# Patient Record
Sex: MALE | Race: WHITE | URBAN - METROPOLITAN AREA
[De-identification: names, ages, dates, MRNs, and addresses within clinical notes are randomized per-mention and may not be internally consistent; named-entity substitution may affect disease eponyms.]

---

## 2021-04-06 ENCOUNTER — OFFICE VISIT (OUTPATIENT)
Dept: URBAN - METROPOLITAN AREA CLINIC 41 | Facility: CLINIC | Age: 56
End: 2021-04-06
Payer: COMMERCIAL

## 2021-04-06 PROCEDURE — 92235 FLUORESCEIN ANGRPH MLTIFRAME: CPT | Performed by: OPHTHALMOLOGY

## 2021-04-06 PROCEDURE — 92134 CPTRZ OPH DX IMG PST SGM RTA: CPT | Performed by: OPHTHALMOLOGY

## 2021-04-06 PROCEDURE — 92250 FUNDUS PHOTOGRAPHY W/I&R: CPT | Performed by: OPHTHALMOLOGY

## 2021-04-06 PROCEDURE — 99214 OFFICE O/P EST MOD 30 MIN: CPT | Performed by: OPHTHALMOLOGY

## 2021-04-06 ASSESSMENT — INTRAOCULAR PRESSURE
OD: 11
OS: 11

## 2021-04-06 NOTE — IMPRESSION/PLAN
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 Plan: Exam/photos/OCT show severe NPDR w/o CSDME OS. FA 4/6/21 shows no macular leakage, areas of ischemia w/o NVE. Reviewed options of observation, anti-VEGF, and laser; recommend monitoring for progression. Last A1c 8.4. Cont BS/BP/chol control.

## 2021-04-06 NOTE — IMPRESSION/PLAN
Impression: Type 2 diabetes mellitus with proliferative diabetic retinopathy without macular edema, right eye: Y01.9579. Plan: Exam/photos/OCT show VH c/w active PDR w/o CSDME OD. FA 4/6/21 shows no macular leakage, sweeps not performed. Currently, there is no NVI and the IOP is within normal limits. The Dx, NHx, and Px PDR were discussed at length. Reviewed options of observation, anti-VEGF, steroids, and laser; recommend anti-VEGF followed by PRP. The patient understands that the goal of treatment is not to improve vision, but to reduce the likelihood of severe vision loss. The patient was urged to work closely with their PCP to avoid systemic complications of diabetic disease. 

1-2 weeks, Luc[facility] OD; then 1-2 weeks light  OD (PHX-B)

## 2021-04-20 ENCOUNTER — PROCEDURE (OUTPATIENT)
Dept: URBAN - METROPOLITAN AREA CLINIC 41 | Facility: CLINIC | Age: 56
End: 2021-04-20
Payer: COMMERCIAL

## 2021-04-20 PROCEDURE — 67028 INJECTION EYE DRUG: CPT | Performed by: OPHTHALMOLOGY

## 2021-04-20 ASSESSMENT — INTRAOCULAR PRESSURE
OD: 12
OS: 10

## 2021-04-28 ENCOUNTER — PROCEDURE (OUTPATIENT)
Dept: URBAN - METROPOLITAN AREA CLINIC 7 | Facility: CLINIC | Age: 56
End: 2021-04-28
Payer: COMMERCIAL

## 2021-04-28 PROCEDURE — 67228 TREATMENT X10SV RETINOPATHY: CPT | Performed by: OPHTHALMOLOGY

## 2021-04-28 ASSESSMENT — INTRAOCULAR PRESSURE
OS: 18
OD: 18

## 2021-05-25 ENCOUNTER — OFFICE VISIT (OUTPATIENT)
Dept: URBAN - METROPOLITAN AREA CLINIC 27 | Facility: CLINIC | Age: 56
End: 2021-05-25
Payer: COMMERCIAL

## 2021-05-25 PROCEDURE — 92012 INTRM OPH EXAM EST PATIENT: CPT | Performed by: OPHTHALMOLOGY

## 2021-05-25 PROCEDURE — 92134 CPTRZ OPH DX IMG PST SGM RTA: CPT | Performed by: OPHTHALMOLOGY

## 2021-05-25 ASSESSMENT — INTRAOCULAR PRESSURE
OD: 10
OS: 10

## 2021-05-25 NOTE — IMPRESSION/PLAN
Impression: Severe NPDR w/o DME OS Plan: Exam/OCT show severe NPDR w/o CSDME OS. FA 4/6/21 showed no macular leakage, areas of ischemia w/o NVE. Reviewed options of observation, anti-VEGF, and laser; recommend monitoring for progression. Last A1c 8.4. Cont BS/BP/chol control.

## 2021-05-25 NOTE — IMPRESSION/PLAN
Impression: PDR w/o CSDME OD
s/p PRP 4/28/21 Plan: Exam/OCT show improving VH s/p Luc[facility]is 4/20/21 w/o CSDME OD. FA 4/6/21 showed no macular leakage, sweeps not performed. Reviewed options of observation, anti-VEGF, steroids, and laser; recommend monitoring for recurrence. 4-6 weeks, OCT OU, reeval DR/DME OU

## 2021-07-06 ENCOUNTER — OFFICE VISIT (OUTPATIENT)
Dept: URBAN - METROPOLITAN AREA CLINIC 27 | Facility: CLINIC | Age: 56
End: 2021-07-06
Payer: COMMERCIAL

## 2021-07-06 PROCEDURE — 92012 INTRM OPH EXAM EST PATIENT: CPT | Performed by: OPHTHALMOLOGY

## 2021-07-06 PROCEDURE — 92134 CPTRZ OPH DX IMG PST SGM RTA: CPT | Performed by: OPHTHALMOLOGY

## 2021-07-06 ASSESSMENT — INTRAOCULAR PRESSURE
OS: 14
OD: 14

## 2021-07-06 NOTE — IMPRESSION/PLAN
Impression: PDR w/o CSDME OD
s/p PRP 4/28/21 Plan: Exam/OCT show recurrent VH s/p Luc[facility]is 4/20/21 w/o CSDME OD. FA 4/6/21 showed no macular leakage, sweeps not performed. Reviewed options of observation, anti-VEGF, steroids, and laser; pt elects monitoring for recurrence. 4-6 weeks, OCT OU, reeval DR/DME OU

## 2021-08-09 ENCOUNTER — OFFICE VISIT (OUTPATIENT)
Dept: URBAN - METROPOLITAN AREA CLINIC 27 | Facility: CLINIC | Age: 56
End: 2021-08-09
Payer: COMMERCIAL

## 2021-08-09 PROCEDURE — 92012 INTRM OPH EXAM EST PATIENT: CPT | Performed by: OPHTHALMOLOGY

## 2021-08-09 PROCEDURE — 92134 CPTRZ OPH DX IMG PST SGM RTA: CPT | Performed by: OPHTHALMOLOGY

## 2021-08-09 ASSESSMENT — INTRAOCULAR PRESSURE
OS: 13
OD: 19

## 2021-08-09 NOTE — IMPRESSION/PLAN
Impression: Severe NPDR w/o DME OS Plan: Exam/OCT show severe NPDR w/o CSDME OS. FA 4/6/21 showed no macular leakage, areas of ischemia w/o NVE. Reviewed options of observation, anti-VEGF, and laser; recommend monitoring for progression. Last A1c 9.1. Cont BS/BP/chol control.

## 2021-08-09 NOTE — IMPRESSION/PLAN
Impression: PDR w/o CSDME OD
s/p PRP 4/28/21 Plan: Exam/OCT show improving VH s/p Luc[facility]is 4/20/21; no CSDME OD. FA 4/6/21 showed no macular leakage, sweeps not performed. Reviewed options of observation, anti-VEGF, steroids, and laser; pt elects monitoring for recurrence. 4-6 weeks, OCT OU, reeval DR/DME OU

## 2021-10-04 ENCOUNTER — OFFICE VISIT (OUTPATIENT)
Dept: URBAN - METROPOLITAN AREA CLINIC 27 | Facility: CLINIC | Age: 56
End: 2021-10-04
Payer: COMMERCIAL

## 2021-10-04 DIAGNOSIS — H25.13 AGE-RELATED NUCLEAR CATARACT, BILATERAL: ICD-10-CM

## 2021-10-04 DIAGNOSIS — H43.11 VITREOUS HEMORRHAGE, RIGHT EYE: ICD-10-CM

## 2021-10-04 DIAGNOSIS — E11.3492 TYPE 2 DIABETES MELLITUS WITH SEVERE NONPROLIFERATIVE DIABETIC RETINOPATHY WITHOUT MACULAR EDEMA, LEFT EYE: ICD-10-CM

## 2021-10-04 DIAGNOSIS — E11.3591 TYPE 2 DIABETES MELLITUS WITH PROLIFERATIVE DIABETIC RETINOPATHY WITHOUT MACULAR EDEMA, RIGHT EYE: Primary | ICD-10-CM

## 2021-10-04 PROCEDURE — 92134 CPTRZ OPH DX IMG PST SGM RTA: CPT | Performed by: OPHTHALMOLOGY

## 2021-10-04 PROCEDURE — 99214 OFFICE O/P EST MOD 30 MIN: CPT | Performed by: OPHTHALMOLOGY

## 2021-10-04 ASSESSMENT — INTRAOCULAR PRESSURE
OS: 15
OD: 17

## 2021-10-04 NOTE — IMPRESSION/PLAN
Impression: PDR w/o CSDME OD
s/p PRP 4/28/21 Plan: Exam/OCT show improving VH s/p Lucentis 4/20/21; no CSDME OD. FA 4/6/21 showed no macular leakage, sweeps not performed. Reviewed options of observation, anti-VEGF, steroids, and laser; pt elects monitoring for recurrence. 4-6 weeks, photos/FA sweeps/OCT OU, reeval DR/DME OU

## 2021-11-08 ENCOUNTER — OFFICE VISIT (OUTPATIENT)
Dept: URBAN - METROPOLITAN AREA CLINIC 27 | Facility: CLINIC | Age: 56
End: 2021-11-08
Payer: COMMERCIAL

## 2021-11-08 PROCEDURE — 92012 INTRM OPH EXAM EST PATIENT: CPT | Performed by: OPHTHALMOLOGY

## 2021-11-08 PROCEDURE — 92235 FLUORESCEIN ANGRPH MLTIFRAME: CPT | Performed by: OPHTHALMOLOGY

## 2021-11-08 PROCEDURE — 92250 FUNDUS PHOTOGRAPHY W/I&R: CPT | Performed by: OPHTHALMOLOGY

## 2021-11-08 PROCEDURE — 92134 CPTRZ OPH DX IMG PST SGM RTA: CPT | Performed by: OPHTHALMOLOGY

## 2021-11-08 ASSESSMENT — INTRAOCULAR PRESSURE
OS: 11
OD: 18

## 2021-11-08 NOTE — IMPRESSION/PLAN
Impression: proliferative diabetic retinopathy without macular edema OU
 - s/p PRP OD 4/28/21 Plan: Exam/photos/OCT show improving VH w/o CSDME OD, last Lucentis 4/20/21. FA sweeps 11/8/21 shows no macular leakage, NVE along [address]. Reviewed options of observation, anti-VEGF, steroids, and laser; pt elects monitoring for recurrence. Exam/photos/OCT show new-onset NVD w/o CSDME OS. FA sweeps 11/8/21 shows no macular leakage, ischemia with NVD OS. As there is no VH at this time, will monitor closely. 4-6 weeks, OCT OU, reeval

## 2022-01-05 ENCOUNTER — OFFICE VISIT (OUTPATIENT)
Dept: URBAN - METROPOLITAN AREA CLINIC 27 | Facility: CLINIC | Age: 57
End: 2022-01-05
Payer: COMMERCIAL

## 2022-01-05 DIAGNOSIS — H43.812 VITREOUS DEGENERATION, LEFT EYE: ICD-10-CM

## 2022-01-05 DIAGNOSIS — E11.3593 TYPE 2 DIABETES MELLITUS WITH PROLIFERATIVE DIABETIC RETINOPATHY WITHOUT MACULAR EDEMA, BILATERAL: Primary | ICD-10-CM

## 2022-01-05 PROCEDURE — 92134 CPTRZ OPH DX IMG PST SGM RTA: CPT | Performed by: OPHTHALMOLOGY

## 2022-01-05 PROCEDURE — 99214 OFFICE O/P EST MOD 30 MIN: CPT | Performed by: OPHTHALMOLOGY

## 2022-01-05 ASSESSMENT — INTRAOCULAR PRESSURE
OD: 15
OS: 14

## 2022-01-05 NOTE — IMPRESSION/PLAN
Impression: PDR OD / NPDR OS
 - s/p PRP OD 4/28/21 Plan: Exam and OCT reveal no DME OU. Observe. BS control.  

Return in 6-8 weeks with [facility], located within [facility]

## 2022-02-14 ENCOUNTER — OFFICE VISIT (OUTPATIENT)
Dept: URBAN - METROPOLITAN AREA CLINIC 27 | Facility: CLINIC | Age: 57
End: 2022-02-14
Payer: COMMERCIAL

## 2022-02-14 PROCEDURE — 92012 INTRM OPH EXAM EST PATIENT: CPT | Performed by: OPHTHALMOLOGY

## 2022-02-14 PROCEDURE — 92134 CPTRZ OPH DX IMG PST SGM RTA: CPT | Performed by: OPHTHALMOLOGY

## 2022-02-14 ASSESSMENT — INTRAOCULAR PRESSURE
OD: 11
OS: 11

## 2022-02-14 NOTE — IMPRESSION/PLAN
Impression: proliferative diabetic retinopathy without macular edema OU
 - s/p PRP OD 4/28/21 Plan: Exam/OCT show improving VH w/o CSDME OD, last Luc[facility]is 4/20/21. FA sweeps 11/8/21 showed no macular leakage, NVE along [address]. Reviewed options of observation, anti-VEGF, steroids, and laser; pt elects monitoring for recurrence. Exam/OCT show NVD w/o CSDME OS. FA sweeps 11/8/21 showed no macular leakage, ischemia with NVD OS. As there is no VH at this time, will monitor closely. 4-6 weeks, OCT OU, reeval

## 2022-03-21 ENCOUNTER — OFFICE VISIT (OUTPATIENT)
Dept: URBAN - METROPOLITAN AREA CLINIC 27 | Facility: CLINIC | Age: 57
End: 2022-03-21
Payer: COMMERCIAL

## 2022-03-21 PROCEDURE — 99214 OFFICE O/P EST MOD 30 MIN: CPT | Performed by: OPHTHALMOLOGY

## 2022-03-21 PROCEDURE — 92134 CPTRZ OPH DX IMG PST SGM RTA: CPT | Performed by: OPHTHALMOLOGY

## 2022-03-21 ASSESSMENT — INTRAOCULAR PRESSURE
OD: 11
OS: 11

## 2022-03-21 NOTE — IMPRESSION/PLAN
Impression: proliferative diabetic retinopathy without macular edema OU
 - s/p PRP OD 4/28/21 Plan: Exam/OCT show improving VH w/o CSDME OD, last Lucentis 4/20/21. FA sweeps 11/8/21 showed no macular leakage, NVE along [address]. Reviewed options of observation, anti-VEGF, steroids, and laser; pt elects monitoring for recurrence. Exam/OCT show NVD w/o CSDME OS. FA sweeps 11/8/21 showed no macular leakage, ischemia with NVD OS. As there is no VH at this time, will monitor closely. 

6 weeks, photos/FA sweeps/OCT OU, reeval

## 2022-05-02 ENCOUNTER — OFFICE VISIT (OUTPATIENT)
Dept: URBAN - METROPOLITAN AREA CLINIC 27 | Facility: CLINIC | Age: 57
End: 2022-05-02
Payer: COMMERCIAL

## 2022-05-02 DIAGNOSIS — E11.3593 TYPE 2 DIABETES MELLITUS WITH PROLIFERATIVE DIABETIC RETINOPATHY WITHOUT MACULAR EDEMA, BILATERAL: Primary | ICD-10-CM

## 2022-05-02 DIAGNOSIS — H43.12 VITREOUS HEMORRHAGE, LEFT EYE: ICD-10-CM

## 2022-05-02 PROCEDURE — 92012 INTRM OPH EXAM EST PATIENT: CPT | Performed by: OPHTHALMOLOGY

## 2022-05-02 PROCEDURE — 92235 FLUORESCEIN ANGRPH MLTIFRAME: CPT | Performed by: OPHTHALMOLOGY

## 2022-05-02 PROCEDURE — 92250 FUNDUS PHOTOGRAPHY W/I&R: CPT | Performed by: OPHTHALMOLOGY

## 2022-05-02 PROCEDURE — 92134 CPTRZ OPH DX IMG PST SGM RTA: CPT | Performed by: OPHTHALMOLOGY

## 2022-05-02 PROCEDURE — 67028 INJECTION EYE DRUG: CPT | Performed by: OPHTHALMOLOGY

## 2022-05-02 ASSESSMENT — INTRAOCULAR PRESSURE
OS: 14
OD: 15

## 2022-05-02 NOTE — IMPRESSION/PLAN
Impression: proliferative diabetic retinopathy without macular edema OU
 - s/p PRP OD 4/28/21 Plan: Exam/OCT show active PDR w/o CSDME OD, last Luc[facility]is 4/20/21. Photos 5/2/22 showed NVE. FA sweeps 5/2/22 showed no macular leakage, several areas of NVE. Reviewed options of observation, anti-VEGF, steroids, and laser; pt elects monitoring for recurrence. Pt c/o new floaters OS. Exam/OCT show NVD/VH w/o CSDME OS. Photos 5/2/22 showed PDR/VH. FA sweeps 5/2/22 showed no macular leakage, ischemia with NVD OS. Recommend Avastin OS today.  

6 weeks, DFE/OCT OU, reeval

## 2022-06-13 ENCOUNTER — OFFICE VISIT (OUTPATIENT)
Dept: URBAN - METROPOLITAN AREA CLINIC 27 | Facility: CLINIC | Age: 57
End: 2022-06-13
Payer: COMMERCIAL

## 2022-06-13 DIAGNOSIS — E11.3593 TYPE 2 DIABETES MELLITUS WITH PROLIFERATIVE DIABETIC RETINOPATHY WITHOUT MACULAR EDEMA, BILATERAL: Primary | ICD-10-CM

## 2022-06-13 PROCEDURE — 92134 CPTRZ OPH DX IMG PST SGM RTA: CPT | Performed by: OPHTHALMOLOGY

## 2022-06-13 PROCEDURE — 67028 INJECTION EYE DRUG: CPT | Performed by: OPHTHALMOLOGY

## 2022-06-13 PROCEDURE — 92012 INTRM OPH EXAM EST PATIENT: CPT | Performed by: OPHTHALMOLOGY

## 2022-06-13 ASSESSMENT — INTRAOCULAR PRESSURE
OD: 13
OS: 12

## 2022-06-13 NOTE — IMPRESSION/PLAN
Impression: proliferative diabetic retinopathy without macular edema OU
 - s/p PRP OD 4/28/21 Plan: Exam/OCT show active PDR w/o CSDME OD, last [facility]is 4/20/21. Photos 5/2/22 showed NVE. FA sweeps 5/2/22 showed no macular leakage, several areas of NVE. Reviewed options of observation, anti-VEGF, steroids, and laser; pt elects monitoring for recurrence. Improving but persistent floaters OS. Exam/OCT show NVD/VH w/o CSDME OS. Photos 5/2/22 showed PDR/VH. FA sweeps 5/2/22 showed no macular leakage, ischemia with NVD OS. Recommend Avastin OS today.  

6 weeks, DFE/OCT OU, reeval

## 2022-07-25 ENCOUNTER — OFFICE VISIT (OUTPATIENT)
Dept: URBAN - METROPOLITAN AREA CLINIC 27 | Facility: CLINIC | Age: 57
End: 2022-07-25
Payer: COMMERCIAL

## 2022-07-25 DIAGNOSIS — E11.3593 TYPE 2 DIABETES MELLITUS WITH PROLIFERATIVE DIABETIC RETINOPATHY WITHOUT MACULAR EDEMA, BILATERAL: Primary | ICD-10-CM

## 2022-07-25 PROCEDURE — 67028 INJECTION EYE DRUG: CPT | Performed by: OPHTHALMOLOGY

## 2022-07-25 PROCEDURE — 92134 CPTRZ OPH DX IMG PST SGM RTA: CPT | Performed by: OPHTHALMOLOGY

## 2022-07-25 PROCEDURE — 92012 INTRM OPH EXAM EST PATIENT: CPT | Performed by: OPHTHALMOLOGY

## 2022-07-25 ASSESSMENT — INTRAOCULAR PRESSURE
OS: 12
OD: 11

## 2022-07-25 NOTE — IMPRESSION/PLAN
Impression: PDR without macular edema OU
 - s/p PRP OD 4/28/21 Plan: Exam/OCT show PDR w/o CSDME OD, last [facility]is 4/20/21. Photos 5/2/22 showed NVE. FA sweeps 5/2/22 showed no macular leakage, several areas of NVE. Reviewed options of observation, anti-VEGF, steroids, and laser; pt elects monitoring for recurrence. Improving but persistent floaters OS. Exam/OCT show NVD/VH w/o CSDME OS. Photos 5/2/22 showed PDR/VH. FA sweeps 5/2/22 showed no macular leakage, ischemia with NVD OS. Recommend Avastin OS today.  

6 weeks, DFE/OCT OU, reeval

## 2022-09-12 ENCOUNTER — OFFICE VISIT (OUTPATIENT)
Dept: URBAN - METROPOLITAN AREA CLINIC 27 | Facility: CLINIC | Age: 57
End: 2022-09-12
Payer: COMMERCIAL

## 2022-09-12 DIAGNOSIS — E11.3593 TYPE 2 DIABETES MELLITUS WITH PROLIFERATIVE DIABETIC RETINOPATHY WITHOUT MACULAR EDEMA, BILATERAL: Primary | ICD-10-CM

## 2022-09-12 DIAGNOSIS — H25.13 AGE-RELATED NUCLEAR CATARACT, BILATERAL: ICD-10-CM

## 2022-09-12 DIAGNOSIS — H43.812 VITREOUS DEGENERATION, LEFT EYE: ICD-10-CM

## 2022-09-12 PROCEDURE — 92014 COMPRE OPH EXAM EST PT 1/>: CPT | Performed by: OPHTHALMOLOGY

## 2022-09-12 PROCEDURE — 92134 CPTRZ OPH DX IMG PST SGM RTA: CPT | Performed by: OPHTHALMOLOGY

## 2022-09-12 ASSESSMENT — INTRAOCULAR PRESSURE
OS: 13
OD: 11

## 2022-09-12 NOTE — IMPRESSION/PLAN
Impression: PDR without macular edema OU
 - s/p PRP OD 4/28/21 Plan: Exam/OCT show new VH c/w active PDR w/o CSDME OD, last [facility] 4/20/21. Photos 5/2/22 showed NVE. FA sweeps 5/2/22 showed no macular leakage, several areas of NVE. Reviewed options of observation, anti-VEGF, steroids, and laser; pt elects Avastin OD today. Improving but persistent floaters OS. Exam/OCT show NVD/VH w/o CSDME OS. Photos 5/2/22 showed PDR/VH. FA sweeps 5/2/22 showed no macular leakage, ischemia with NVD OS. Recommend Avastin OS today.  

6 weeks, DFE/OCT OU, reeval

## 2022-10-24 ENCOUNTER — OFFICE VISIT (OUTPATIENT)
Dept: URBAN - METROPOLITAN AREA CLINIC 27 | Facility: CLINIC | Age: 57
End: 2022-10-24
Payer: COMMERCIAL

## 2022-10-24 DIAGNOSIS — H43.13 VITREOUS HEMORRHAGE, BILATERAL: ICD-10-CM

## 2022-10-24 DIAGNOSIS — E11.3593 TYPE 2 DIABETES MELLITUS WITH PROLIFERATIVE DIABETIC RETINOPATHY WITHOUT MACULAR EDEMA, BILATERAL: Primary | ICD-10-CM

## 2022-10-24 PROCEDURE — 92012 INTRM OPH EXAM EST PATIENT: CPT | Performed by: OPHTHALMOLOGY

## 2022-10-24 PROCEDURE — 92134 CPTRZ OPH DX IMG PST SGM RTA: CPT | Performed by: OPHTHALMOLOGY

## 2022-10-24 ASSESSMENT — INTRAOCULAR PRESSURE
OD: 14
OS: 14

## 2022-10-24 NOTE — IMPRESSION/PLAN
Impression: PDR without macular edema OU
 - s/p PRP OD 4/28/21 Plan: Exam/OCT show improving VH/active PDR w/o CSDME OD. Photos 5/2/22 showed NVE. FA sweeps 5/2/22 showed no macular leakage, several areas of NVE. Reviewed options of observation, anti-VEGF, steroids, and laser; pt elects Avastin OD today. Improving but persistent floaters OS. Exam/OCT show NVD/VH w/o CSDME OS. Photos 5/2/22 showed PDR/VH. FA sweeps 5/2/22 showed no macular leakage, ischemia with NVD OS. Recommend Avastin OS today.  

6 weeks, DFE/OCT OU, reeval

## 2022-12-05 ENCOUNTER — OFFICE VISIT (OUTPATIENT)
Dept: URBAN - METROPOLITAN AREA CLINIC 27 | Facility: CLINIC | Age: 57
End: 2022-12-05
Payer: COMMERCIAL

## 2022-12-05 DIAGNOSIS — H43.13 VITREOUS HEMORRHAGE, BILATERAL: ICD-10-CM

## 2022-12-05 DIAGNOSIS — E11.3593 TYPE 2 DIABETES MELLITUS WITH PROLIFERATIVE DIABETIC RETINOPATHY WITHOUT MACULAR EDEMA, BILATERAL: Primary | ICD-10-CM

## 2022-12-05 PROCEDURE — 92134 CPTRZ OPH DX IMG PST SGM RTA: CPT | Performed by: OPHTHALMOLOGY

## 2022-12-05 PROCEDURE — 99212 OFFICE O/P EST SF 10 MIN: CPT | Performed by: OPHTHALMOLOGY

## 2022-12-05 ASSESSMENT — INTRAOCULAR PRESSURE
OS: 15
OD: 18

## 2022-12-05 NOTE — IMPRESSION/PLAN
Impression: PDR without macular edema OU
 - s/p PRP OD 4/28/21 Plan: Exam/OCT show improving VH/active PDR w/o CSDME OD. Photos 5/2/22 showed NVE. FA sweeps 5/2/22 showed no macular leakage, several areas of NVE. Reviewed options of observation, anti-VEGF, steroids, and laser; pt elects Avastin OD today. Exam/OCT show NVD/VH w/o CSDME OS. Photos 5/2/22 showed PDR/VH. FA sweeps 5/2/22 showed no macular leakage, ischemia with NVD OS. Recommend Avastin OS today. PRP OU once view is improved. Given persistence, submit Lucentis BI. 

6 weeks, DFE/OCT OU, reeval  (lucentis)

## 2023-01-16 ENCOUNTER — OFFICE VISIT (OUTPATIENT)
Dept: URBAN - METROPOLITAN AREA CLINIC 27 | Facility: CLINIC | Age: 58
End: 2023-01-16
Payer: COMMERCIAL

## 2023-01-16 DIAGNOSIS — E11.3593 TYPE 2 DIABETES MELLITUS WITH PROLIFERATIVE DIABETIC RETINOPATHY WITHOUT MACULAR EDEMA, BILATERAL: Primary | ICD-10-CM

## 2023-01-16 DIAGNOSIS — H43.13 VITREOUS HEMORRHAGE, BILATERAL: ICD-10-CM

## 2023-01-16 PROCEDURE — 92134 CPTRZ OPH DX IMG PST SGM RTA: CPT | Performed by: OPHTHALMOLOGY

## 2023-01-16 PROCEDURE — 99212 OFFICE O/P EST SF 10 MIN: CPT | Performed by: OPHTHALMOLOGY

## 2023-01-16 ASSESSMENT — INTRAOCULAR PRESSURE
OD: 15
OS: 15

## 2023-01-16 NOTE — IMPRESSION/PLAN
Impression: PDR without macular edema OU
 - s/p PRP OD 4/28/21 Plan: Exam/OCT show improving VH/active PDR w/o CSDME OD. Photos 5/2/22 showed NVE. FA sweeps 5/2/22 showed no macular leakage, several areas of NVE. Reviewed options of observation, anti-VEGF, steroids, and laser; pt elects Lucentis OD today, will schedule for PRP. Exam/OCT show NVD/VH w/o CSDME OS. Photos 5/2/22 showed PDR/VH. FA sweeps 5/2/22 showed no macular leakage, ischemia with NVD OS. Recommend Lucentis OS today, will schedule for PRP. 1-2 weeks, PRP OS (PHX-B); then 1-2 weeks, PRP OD

## 2023-01-25 ENCOUNTER — PROCEDURE (OUTPATIENT)
Dept: URBAN - METROPOLITAN AREA CLINIC 7 | Facility: CLINIC | Age: 58
End: 2023-01-25
Payer: COMMERCIAL

## 2023-01-25 DIAGNOSIS — E11.3593 TYPE 2 DIABETES MELLITUS WITH PROLIFERATIVE DIABETIC RETINOPATHY WITHOUT MACULAR EDEMA, BILATERAL: Primary | ICD-10-CM

## 2023-01-25 PROCEDURE — 67228 TREATMENT X10SV RETINOPATHY: CPT | Performed by: OPHTHALMOLOGY

## 2023-01-25 ASSESSMENT — INTRAOCULAR PRESSURE
OD: 13
OS: 14

## 2023-02-08 ENCOUNTER — PROCEDURE (OUTPATIENT)
Dept: URBAN - METROPOLITAN AREA CLINIC 7 | Facility: CLINIC | Age: 58
End: 2023-02-08
Payer: COMMERCIAL

## 2023-02-08 DIAGNOSIS — E11.3593 TYPE 2 DIABETES MELLITUS WITH PROLIFERATIVE DIABETIC RETINOPATHY WITHOUT MACULAR EDEMA, BILATERAL: Primary | ICD-10-CM

## 2023-02-08 PROCEDURE — 67228 TREATMENT X10SV RETINOPATHY: CPT | Performed by: OPHTHALMOLOGY

## 2023-02-08 ASSESSMENT — INTRAOCULAR PRESSURE
OD: 20
OS: 23

## 2023-03-06 ENCOUNTER — OFFICE VISIT (OUTPATIENT)
Dept: URBAN - METROPOLITAN AREA CLINIC 27 | Facility: CLINIC | Age: 58
End: 2023-03-06
Payer: COMMERCIAL

## 2023-03-06 DIAGNOSIS — H43.13 VITREOUS HEMORRHAGE, BILATERAL: ICD-10-CM

## 2023-03-06 DIAGNOSIS — E11.3593 TYPE 2 DIABETES MELLITUS WITH PROLIFERATIVE DIABETIC RETINOPATHY WITHOUT MACULAR EDEMA, BILATERAL: Primary | ICD-10-CM

## 2023-03-06 DIAGNOSIS — H25.13 AGE-RELATED NUCLEAR CATARACT, BILATERAL: ICD-10-CM

## 2023-03-06 PROCEDURE — 92134 CPTRZ OPH DX IMG PST SGM RTA: CPT | Performed by: OPHTHALMOLOGY

## 2023-03-06 PROCEDURE — 92014 COMPRE OPH EXAM EST PT 1/>: CPT | Performed by: OPHTHALMOLOGY

## 2023-03-06 ASSESSMENT — INTRAOCULAR PRESSURE
OS: 17
OD: 18

## 2023-03-06 NOTE — IMPRESSION/PLAN
Impression: PDR without macular edema OU
 - s/p PRP OD 4/28/21 and 2/8/23
 - s/p PRP OS 1/25/23 Plan: Exam/OCT show improving VH w/o CSDME OD. Photos 5/2/22 showed NVE. FA sweeps 5/2/22 showed no macular leakage, several areas of NVE. Reviewed options of observation, anti-VEGF, steroids, and laser; pt elects Lucentis OD today, attempt extension. Exam/OCT show NVD/VH w/o CSDME OS. Photos 5/2/22 showed PDR/VH. FA sweeps 5/2/22 showed no macular leakage, ischemia with NVD OS. Recommend Lucentis OS today, attempt extension. 

8 weeks, photos/FA sweeps/OCT OU, Luc[facility]is OU

## 2023-05-01 ENCOUNTER — OFFICE VISIT (OUTPATIENT)
Dept: URBAN - METROPOLITAN AREA CLINIC 27 | Facility: CLINIC | Age: 58
End: 2023-05-01
Payer: COMMERCIAL

## 2023-05-01 DIAGNOSIS — H43.13 VITREOUS HEMORRHAGE, BILATERAL: ICD-10-CM

## 2023-05-01 DIAGNOSIS — E11.3593 TYPE 2 DIABETES MELLITUS WITH PROLIFERATIVE DIABETIC RETINOPATHY WITHOUT MACULAR EDEMA, BILATERAL: Primary | ICD-10-CM

## 2023-05-01 PROCEDURE — 92250 FUNDUS PHOTOGRAPHY W/I&R: CPT | Performed by: OPHTHALMOLOGY

## 2023-05-01 PROCEDURE — 92134 CPTRZ OPH DX IMG PST SGM RTA: CPT | Performed by: OPHTHALMOLOGY

## 2023-05-01 PROCEDURE — 92235 FLUORESCEIN ANGRPH MLTIFRAME: CPT | Performed by: OPHTHALMOLOGY

## 2023-05-01 ASSESSMENT — INTRAOCULAR PRESSURE
OS: 16
OD: 18

## 2023-05-01 NOTE — IMPRESSION/PLAN
Impression: PDR without macular edema OU
 - s/p PRP OD 4/28/21 and 2/8/23
 - s/p PRP OS 1/25/23 Plan: Exam/OCT show improving VH w/o CSDME OD. Photos 5/1/23 showed PDR. FA sweeps 5/1/23 showed angiographic leakage, resolved NVE. Reviewed options of observation, anti-VEGF, steroids, and laser; pt elects Lucentis OD today, cont extension. Exam/OCT show improving VH w/o CSDME OS. Photos 5/1/23 showed PDR. FA sweeps 5/1/23 showed no macular leakage, ischemia with resolved NVD OS. Recommend Lucentis OS today, cont extension. 

12 weeks, DFE/OCT OU, Luc[facility]is OU

## 2023-07-31 ENCOUNTER — OFFICE VISIT (OUTPATIENT)
Dept: URBAN - METROPOLITAN AREA CLINIC 27 | Facility: CLINIC | Age: 58
End: 2023-07-31
Payer: COMMERCIAL

## 2023-07-31 DIAGNOSIS — H43.13 VITREOUS HEMORRHAGE, BILATERAL: ICD-10-CM

## 2023-07-31 DIAGNOSIS — E11.3593 TYPE 2 DIABETES MELLITUS WITH PROLIFERATIVE DIABETIC RETINOPATHY WITHOUT MACULAR EDEMA, BILATERAL: Primary | ICD-10-CM

## 2023-07-31 PROCEDURE — 99213 OFFICE O/P EST LOW 20 MIN: CPT | Performed by: OPHTHALMOLOGY

## 2023-07-31 PROCEDURE — 92134 CPTRZ OPH DX IMG PST SGM RTA: CPT | Performed by: OPHTHALMOLOGY

## 2023-07-31 ASSESSMENT — INTRAOCULAR PRESSURE
OD: 14
OS: 13

## 2023-10-02 ENCOUNTER — OFFICE VISIT (OUTPATIENT)
Dept: URBAN - METROPOLITAN AREA CLINIC 27 | Facility: CLINIC | Age: 58
End: 2023-10-02
Payer: COMMERCIAL

## 2023-10-02 DIAGNOSIS — H43.13 VITREOUS HEMORRHAGE, BILATERAL: ICD-10-CM

## 2023-10-02 DIAGNOSIS — H25.13 AGE-RELATED NUCLEAR CATARACT, BILATERAL: ICD-10-CM

## 2023-10-02 DIAGNOSIS — E11.3593 TYPE 2 DIABETES MELLITUS WITH PROLIFERATIVE DIABETIC RETINOPATHY WITHOUT MACULAR EDEMA, BILATERAL: Primary | ICD-10-CM

## 2023-10-02 PROCEDURE — 92014 COMPRE OPH EXAM EST PT 1/>: CPT | Performed by: OPHTHALMOLOGY

## 2023-10-02 PROCEDURE — 92134 CPTRZ OPH DX IMG PST SGM RTA: CPT | Performed by: OPHTHALMOLOGY

## 2023-10-02 ASSESSMENT — INTRAOCULAR PRESSURE
OD: 20
OS: 17

## 2024-01-02 ENCOUNTER — OFFICE VISIT (OUTPATIENT)
Dept: URBAN - METROPOLITAN AREA CLINIC 27 | Facility: CLINIC | Age: 59
End: 2024-01-02
Payer: COMMERCIAL

## 2024-01-02 DIAGNOSIS — H43.13 VITREOUS HEMORRHAGE, BILATERAL: ICD-10-CM

## 2024-01-02 DIAGNOSIS — E11.3593 TYPE 2 DIABETES MELLITUS WITH PROLIFERATIVE DIABETIC RETINOPATHY WITHOUT MACULAR EDEMA, BILATERAL: Primary | ICD-10-CM

## 2024-01-02 PROCEDURE — 99213 OFFICE O/P EST LOW 20 MIN: CPT | Performed by: OPHTHALMOLOGY

## 2024-01-02 PROCEDURE — 92134 CPTRZ OPH DX IMG PST SGM RTA: CPT | Performed by: OPHTHALMOLOGY

## 2024-01-02 ASSESSMENT — INTRAOCULAR PRESSURE
OS: 19
OD: 20

## 2024-04-05 ENCOUNTER — OFFICE VISIT (OUTPATIENT)
Dept: URBAN - METROPOLITAN AREA CLINIC 27 | Facility: CLINIC | Age: 59
End: 2024-04-05
Payer: COMMERCIAL

## 2024-04-05 DIAGNOSIS — H43.13 VITREOUS HEMORRHAGE, BILATERAL: ICD-10-CM

## 2024-04-05 DIAGNOSIS — H25.13 AGE-RELATED NUCLEAR CATARACT, BILATERAL: ICD-10-CM

## 2024-04-05 DIAGNOSIS — E11.3593 TYPE 2 DIABETES MELLITUS WITH PROLIFERATIVE DIABETIC RETINOPATHY WITHOUT MACULAR EDEMA, BILATERAL: Primary | ICD-10-CM

## 2024-04-05 PROCEDURE — 92134 CPTRZ OPH DX IMG PST SGM RTA: CPT | Performed by: OPHTHALMOLOGY

## 2024-04-05 PROCEDURE — 92235 FLUORESCEIN ANGRPH MLTIFRAME: CPT | Performed by: OPHTHALMOLOGY

## 2024-04-05 PROCEDURE — 99214 OFFICE O/P EST MOD 30 MIN: CPT | Performed by: OPHTHALMOLOGY

## 2024-04-05 PROCEDURE — 92250 FUNDUS PHOTOGRAPHY W/I&R: CPT | Performed by: OPHTHALMOLOGY

## 2024-04-05 ASSESSMENT — INTRAOCULAR PRESSURE
OS: 14
OD: 12

## 2024-06-28 ENCOUNTER — OFFICE VISIT (OUTPATIENT)
Dept: URBAN - METROPOLITAN AREA CLINIC 27 | Facility: CLINIC | Age: 59
End: 2024-06-28
Payer: COMMERCIAL

## 2024-06-28 DIAGNOSIS — E11.3593 TYPE 2 DIABETES MELLITUS WITH PROLIFERATIVE DIABETIC RETINOPATHY WITHOUT MACULAR EDEMA, BILATERAL: Primary | ICD-10-CM

## 2024-06-28 DIAGNOSIS — H43.13 VITREOUS HEMORRHAGE, BILATERAL: ICD-10-CM

## 2024-06-28 PROCEDURE — 92134 CPTRZ OPH DX IMG PST SGM RTA: CPT | Performed by: OPHTHALMOLOGY

## 2024-06-28 PROCEDURE — 99213 OFFICE O/P EST LOW 20 MIN: CPT | Performed by: OPHTHALMOLOGY

## 2024-06-28 ASSESSMENT — INTRAOCULAR PRESSURE
OS: 15
OD: 16

## 2024-12-23 ENCOUNTER — OFFICE VISIT (OUTPATIENT)
Dept: URBAN - METROPOLITAN AREA CLINIC 27 | Facility: CLINIC | Age: 59
End: 2024-12-23
Payer: COMMERCIAL

## 2024-12-23 DIAGNOSIS — H25.13 AGE-RELATED NUCLEAR CATARACT, BILATERAL: ICD-10-CM

## 2024-12-23 DIAGNOSIS — H43.13 VITREOUS HEMORRHAGE, BILATERAL: ICD-10-CM

## 2024-12-23 DIAGNOSIS — E11.3593 TYPE 2 DIABETES MELLITUS WITH PROLIFERATIVE DIABETIC RETINOPATHY WITHOUT MACULAR EDEMA, BILATERAL: Primary | ICD-10-CM

## 2024-12-23 PROCEDURE — 99214 OFFICE O/P EST MOD 30 MIN: CPT | Performed by: OPHTHALMOLOGY

## 2024-12-23 PROCEDURE — 92134 CPTRZ OPH DX IMG PST SGM RTA: CPT | Performed by: OPHTHALMOLOGY

## 2024-12-23 ASSESSMENT — INTRAOCULAR PRESSURE
OS: 18
OD: 18

## 2025-05-30 ENCOUNTER — OFFICE VISIT (OUTPATIENT)
Dept: URBAN - METROPOLITAN AREA CLINIC 27 | Facility: CLINIC | Age: 60
End: 2025-05-30
Payer: COMMERCIAL

## 2025-05-30 DIAGNOSIS — E11.3593 TYPE 2 DIABETES MELLITUS WITH PROLIFERATIVE DIABETIC RETINOPATHY WITHOUT MACULAR EDEMA, BILATERAL: Primary | ICD-10-CM

## 2025-05-30 DIAGNOSIS — H25.13 AGE-RELATED NUCLEAR CATARACT, BILATERAL: ICD-10-CM

## 2025-05-30 PROCEDURE — 92235 FLUORESCEIN ANGRPH MLTIFRAME: CPT | Performed by: OPHTHALMOLOGY

## 2025-05-30 PROCEDURE — 99214 OFFICE O/P EST MOD 30 MIN: CPT | Performed by: OPHTHALMOLOGY

## 2025-05-30 PROCEDURE — 92134 CPTRZ OPH DX IMG PST SGM RTA: CPT | Performed by: OPHTHALMOLOGY

## 2025-05-30 ASSESSMENT — INTRAOCULAR PRESSURE
OD: 15
OS: 15